# Patient Record
Sex: FEMALE | Race: BLACK OR AFRICAN AMERICAN | NOT HISPANIC OR LATINO | Employment: FULL TIME | URBAN - METROPOLITAN AREA
[De-identification: names, ages, dates, MRNs, and addresses within clinical notes are randomized per-mention and may not be internally consistent; named-entity substitution may affect disease eponyms.]

---

## 2018-05-21 ENCOUNTER — HOSPITAL ENCOUNTER (EMERGENCY)
Facility: HOSPITAL | Age: 23
Discharge: HOME | End: 2018-05-21
Payer: MEDICAID

## 2018-05-21 VITALS
WEIGHT: 220 LBS | HEART RATE: 78 BPM | RESPIRATION RATE: 16 BRPM | TEMPERATURE: 99 F | DIASTOLIC BLOOD PRESSURE: 68 MMHG | SYSTOLIC BLOOD PRESSURE: 122 MMHG | OXYGEN SATURATION: 99 %

## 2018-05-21 DIAGNOSIS — K59.09 INTERMITTENT CONSTIPATION: Primary | ICD-10-CM

## 2018-05-21 DIAGNOSIS — R10.9 ABDOMINAL PAIN: ICD-10-CM

## 2018-05-21 LAB
ALBUMIN SERPL BCP-MCNC: 3.7 G/DL
ALP SERPL-CCNC: 71 U/L
ALT SERPL W/O P-5'-P-CCNC: 13 U/L
ANION GAP SERPL CALC-SCNC: 10 MMOL/L
AST SERPL-CCNC: 17 U/L
B-HCG UR QL: NEGATIVE
BASOPHILS # BLD AUTO: 0.03 K/UL
BASOPHILS NFR BLD: 0.5 %
BILIRUB SERPL-MCNC: 0.7 MG/DL
BILIRUB UR QL STRIP: NEGATIVE
BUN SERPL-MCNC: 4 MG/DL
CALCIUM SERPL-MCNC: 9.5 MG/DL
CHLORIDE SERPL-SCNC: 102 MMOL/L
CLARITY UR: CLEAR
CO2 SERPL-SCNC: 25 MMOL/L
COLOR UR: YELLOW
CREAT SERPL-MCNC: 0.7 MG/DL
DIFFERENTIAL METHOD: NORMAL
EOSINOPHIL # BLD AUTO: 0.1 K/UL
EOSINOPHIL NFR BLD: 1.5 %
ERYTHROCYTE [DISTWIDTH] IN BLOOD BY AUTOMATED COUNT: 14.2 %
EST. GFR  (AFRICAN AMERICAN): >60 ML/MIN/1.73 M^2
EST. GFR  (NON AFRICAN AMERICAN): >60 ML/MIN/1.73 M^2
GLUCOSE SERPL-MCNC: 86 MG/DL
GLUCOSE UR QL STRIP: NEGATIVE
HCT VFR BLD AUTO: 38.5 %
HGB BLD-MCNC: 13.3 G/DL
HGB UR QL STRIP: NEGATIVE
KETONES UR QL STRIP: NEGATIVE
LEUKOCYTE ESTERASE UR QL STRIP: NEGATIVE
LIPASE SERPL-CCNC: 13 U/L
LYMPHOCYTES # BLD AUTO: 1.6 K/UL
LYMPHOCYTES NFR BLD: 26.3 %
MCH RBC QN AUTO: 28.5 PG
MCHC RBC AUTO-ENTMCNC: 34.5 G/DL
MCV RBC AUTO: 82 FL
MONOCYTES # BLD AUTO: 0.6 K/UL
MONOCYTES NFR BLD: 9.4 %
NEUTROPHILS # BLD AUTO: 3.8 K/UL
NEUTROPHILS NFR BLD: 62.3 %
NITRITE UR QL STRIP: NEGATIVE
PH UR STRIP: 6 [PH] (ref 5–8)
PLATELET # BLD AUTO: 308 K/UL
PMV BLD AUTO: 9.7 FL
POTASSIUM SERPL-SCNC: 3.7 MMOL/L
PROT SERPL-MCNC: 8 G/DL
PROT UR QL STRIP: NEGATIVE
RBC # BLD AUTO: 4.67 M/UL
SODIUM SERPL-SCNC: 137 MMOL/L
SP GR UR STRIP: >=1.03 (ref 1–1.03)
URN SPEC COLLECT METH UR: ABNORMAL
UROBILINOGEN UR STRIP-ACNC: 1 EU/DL
WBC # BLD AUTO: 6.05 K/UL

## 2018-05-21 PROCEDURE — 83690 ASSAY OF LIPASE: CPT

## 2018-05-21 PROCEDURE — 99284 EMERGENCY DEPT VISIT MOD MDM: CPT | Mod: 25

## 2018-05-21 PROCEDURE — 80053 COMPREHEN METABOLIC PANEL: CPT

## 2018-05-21 PROCEDURE — 81025 URINE PREGNANCY TEST: CPT

## 2018-05-21 PROCEDURE — 96374 THER/PROPH/DIAG INJ IV PUSH: CPT

## 2018-05-21 PROCEDURE — 85025 COMPLETE CBC W/AUTO DIFF WBC: CPT

## 2018-05-21 PROCEDURE — 81003 URINALYSIS AUTO W/O SCOPE: CPT

## 2018-05-21 PROCEDURE — 63600175 PHARM REV CODE 636 W HCPCS: Performed by: REGISTERED NURSE

## 2018-05-21 RX ORDER — DOCUSATE SODIUM 100 MG/1
100 CAPSULE, LIQUID FILLED ORAL 2 TIMES DAILY
Qty: 60 CAPSULE | Refills: 0 | Status: SHIPPED | OUTPATIENT
Start: 2018-05-21

## 2018-05-21 RX ORDER — KETOROLAC TROMETHAMINE 30 MG/ML
30 INJECTION, SOLUTION INTRAMUSCULAR; INTRAVENOUS
Status: COMPLETED | OUTPATIENT
Start: 2018-05-21 | End: 2018-05-21

## 2018-05-21 RX ADMIN — KETOROLAC TROMETHAMINE 30 MG: 30 INJECTION, SOLUTION INTRAMUSCULAR; INTRAVENOUS at 12:05

## 2018-05-21 NOTE — ED PROVIDER NOTES
History      Chief Complaint   Patient presents with    Abdominal Pain     left upper abdominal pain since this morning; denies n/v/d; pt reports pain has improvement since she has got to ED       Review of patient's allergies indicates:  No Known Allergies     HPI   HPI    5/21/2018, 12:15 PM   History obtained from the patient      History of Present Illness: Hermelinda Kitchen is a 22 y.o. female patient who presents to the Emergency Department for LUQ abdominal pain which onset gradually this morning. Pt reports resolution of symptoms at this time. Symptoms are intermittent and moderate in severity. No mitigating or exacerbating factors reported. Associated sxs include irregular bowel movements. Patient denies any NVD, fever, chills, and all other sxs at this time. No prior tx. No further complaints or concerns at this time.         Arrival mode: Personal vehicle     PCP: Sharri Goldman MD       Past Medical History:  Past Medical History:   Diagnosis Date    Asthma        Past Surgical History:  Past Surgical History:   Procedure Laterality Date    TONSILLECTOMY           Family History:  Family History   Problem Relation Age of Onset    Hypertension Mother     Diabetes Mother        Social History:  Social History     Social History Main Topics    Smoking status: Never Smoker    Smokeless tobacco: Not on file    Alcohol use No    Drug use: No    Sexual activity: Not on file       ROS   Review of Systems   Constitutional: Negative for fever.   HENT: Negative for sore throat.    Respiratory: Negative for shortness of breath.    Cardiovascular: Negative for chest pain.   Gastrointestinal: Positive for abdominal pain and constipation. Negative for nausea.   Genitourinary: Negative for dysuria.   Musculoskeletal: Negative for back pain.   Skin: Negative for rash.   Neurological: Negative for weakness.   Hematological: Does not bruise/bleed easily.   All other systems reviewed and are  negative.      Physical Exam      Initial Vitals [05/21/18 1212]   BP Pulse Resp Temp SpO2   (!) 147/82 89 16 98.6 °F (37 °C) 98 %      MAP       103.67          Physical Exam  Nursing Notes and Vital Signs Reviewed.  Constitutional: Patient is in no acute distress. Well-developed and well-nourished.  Head: Atraumatic. Normocephalic.  Eyes: PERRL. EOM intact. Conjunctivae are not pale. No scleral icterus.  ENT: Mucous membranes are moist. Oropharynx is clear and symmetric.    Neck: Supple. Full ROM. No lymphadenopathy.  Cardiovascular: Regular rate. Regular rhythm. No murmurs, rubs, or gallops. Distal pulses are 2+ and symmetric.  Pulmonary/Chest: No respiratory distress. Clear to auscultation bilaterally. No wheezing or rales.  Abdominal: Soft and non-distended.  There is mild tenderness to LUQ.  No rebound, guarding, or rigidity. Good bowel sounds.  Genitourinary: No CVA tenderness  Musculoskeletal: Moves all extremities. No obvious deformities. No edema. No calf tenderness.  Skin: Warm and dry.  Neurological:  Alert, awake, and appropriate.  Normal speech.  No acute focal neurological deficits are appreciated.  Psychiatric: Normal affect. Good eye contact. Appropriate in content.    ED Course    Procedures  ED Vital Signs:  Vitals:    05/21/18 1212 05/21/18 1334   BP: (!) 147/82 122/68   Pulse: 89 78   Resp: 16 16   Temp: 98.6 °F (37 °C)    TempSrc: Oral    SpO2: 98% 99%   Weight: 99.8 kg (220 lb)        Abnormal Lab Results:  Labs Reviewed   URINALYSIS - Abnormal; Notable for the following:        Result Value    Specific Gravity, UA >=1.030 (*)     All other components within normal limits   COMPREHENSIVE METABOLIC PANEL - Abnormal; Notable for the following:     BUN, Bld 4 (*)     All other components within normal limits   PREGNANCY TEST, URINE RAPID   CBC W/ AUTO DIFFERENTIAL   LIPASE        All Lab Results:      Imaging Results:  Imaging Results          X-Ray Abdomen Flat And Erect (Final result)  Result  time 05/21/18 13:06:42    Final result by TAJ Fernandez Sr., MD (05/21/18 13:06:42)                 Impression:      Normal study.      Electronically signed by: Camilo Fernandez MD  Date:    05/21/2018  Time:    13:06             Narrative:    EXAMINATION:  XR ABDOMEN FLAT AND ERECT    CLINICAL HISTORY:  Unspecified abdominal pain    COMPARISON:  None    FINDINGS:  The bowel gas pattern is normal in appearance. There is no pneumoperitoneum. The bony structures appear intact.                                        The Emergency Provider reviewed the vital signs and test results, which are outlined above.    ED Discussion     1:21 PM: Reassessed pt at this time.  Pt states her condition has improved at this time. Discussed with pt all pertinent ED information and results. Discussed pt dx and plan of tx. Gave pt all f/u and return to the ED instructions. All questions and concerns were addressed at this time. Pt expresses understanding of information and instructions, and is comfortable with plan to discharge. Pt is stable for discharge.        ED Medication(s):  Medications   ketorolac injection 30 mg (30 mg Intravenous Given 5/21/18 1256)       Discharge Medication List as of 5/21/2018  1:12 PM      START taking these medications    Details   docusate sodium (COLACE) 100 MG capsule Take 1 capsule (100 mg total) by mouth 2 (two) times daily., Starting Mon 5/21/2018, Print             Follow-up Information     Sharri Goldman MD In 3 days.    Specialty:  Family Medicine  Contact information:  [address] [place] [address] [id number] [phone]                     Medical Decision Making                     Clinical Impression       ICD-10-CM ICD-9-CM   1. Intermittent constipation K59.00 564.00   2. Abdominal pain R10.9 789.00               Ishmael Burton Jr., [facility]  05/21/18 2084

## 2018-05-31 ENCOUNTER — HOSPITAL ENCOUNTER (EMERGENCY)
Facility: HOSPITAL | Age: 23
Discharge: HOME | End: 2018-06-01
Payer: MEDICAID

## 2018-05-31 DIAGNOSIS — H18.821 CORNEAL ABRASION OF RIGHT EYE DUE TO CONTACT LENS: Primary | ICD-10-CM

## 2018-05-31 DIAGNOSIS — H10.31 ACUTE CONJUNCTIVITIS OF RIGHT EYE, UNSPECIFIED ACUTE CONJUNCTIVITIS TYPE: ICD-10-CM

## 2018-05-31 PROCEDURE — 63600175 PHARM REV CODE 636 W HCPCS: Performed by: PHYSICIAN ASSISTANT

## 2018-05-31 PROCEDURE — 25000003 PHARM REV CODE 250: Performed by: PHYSICIAN ASSISTANT

## 2018-05-31 PROCEDURE — 99283 EMERGENCY DEPT VISIT LOW MDM: CPT

## 2018-05-31 RX ORDER — ERYTHROMYCIN 5 MG/G
OINTMENT OPHTHALMIC
Status: COMPLETED | OUTPATIENT
Start: 2018-05-31 | End: 2018-05-31

## 2018-05-31 RX ORDER — PROPARACAINE HYDROCHLORIDE 5 MG/ML
1 SOLUTION/ DROPS OPHTHALMIC
Status: DISCONTINUED | OUTPATIENT
Start: 2018-05-31 | End: 2018-05-31 | Stop reason: SDUPTHER

## 2018-05-31 RX ADMIN — ERYTHROMYCIN 1 INCH: 5 OINTMENT OPHTHALMIC at 11:05

## 2018-05-31 RX ADMIN — FLUORESCEIN SODIUM AND BENOXINATE HYDROCHLORIDE 1 DROP: 4; 2.5 SOLUTION OPHTHALMIC at 11:05

## 2018-06-01 VITALS
WEIGHT: 245.81 LBS | DIASTOLIC BLOOD PRESSURE: 75 MMHG | SYSTOLIC BLOOD PRESSURE: 125 MMHG | HEIGHT: 71 IN | RESPIRATION RATE: 18 BRPM | TEMPERATURE: 100 F | HEART RATE: 87 BPM | OXYGEN SATURATION: 98 % | BODY MASS INDEX: 34.41 KG/M2

## 2018-06-01 RX ORDER — ERYTHROMYCIN 5 MG/G
OINTMENT OPHTHALMIC EVERY 8 HOURS
Qty: 1 TUBE | Refills: 0 | Status: SHIPPED | OUTPATIENT
Start: 2018-06-01 | End: 2018-06-08

## 2018-06-01 NOTE — ED PROVIDER NOTES
Encounter Date: 5/31/2018       History     Chief Complaint   Patient presents with    Eye Pain     Right eye.  States had ulcerationin the past, feels the same.     The patient presents to the ER c/o pain and redness to her right eye. She states that the symptoms began gradually 2 days ago. She states that she thinks that her contact lens scratched her eye. She denies any splash, FB, or trauma to the eye. She states that the degree is moderate. She states that the course is constant. She states that the eye was crusted/matted this morning when she woke up. She denies any vision loss. She discarded her contacts and has only been using glasses for the past 2 days. She states that she has a local ophthalmologist, Dr Hay. She states that she has had a TD vaccine within the past 5 years. No additional symptoms, history, or concern.           Review of patient's allergies indicates:  No Known Allergies  Past Medical History:   Diagnosis Date    Asthma      Past Surgical History:   Procedure Laterality Date    TONSILLECTOMY       Family History   Problem Relation Age of Onset    Hypertension Mother     Diabetes Mother      Social History   Substance Use Topics    Smoking status: Never Smoker    Smokeless tobacco: Never Used    Alcohol use No     Review of Systems   Constitutional: Negative for chills and fever.   HENT: Negative for facial swelling.    Eyes: Positive for photophobia, pain, discharge and redness. Negative for visual disturbance.   Respiratory: Negative for shortness of breath.    Cardiovascular: Negative for chest pain.   Gastrointestinal: Negative for abdominal pain, nausea and vomiting.   Endocrine: Negative for polydipsia and polyuria.   Genitourinary: Negative for menstrual problem.   Musculoskeletal: Negative for arthralgias and myalgias.   Skin: Negative for rash.   Allergic/Immunologic: Negative for immunocompromised state.   Neurological: Negative for dizziness, light-headedness and  headaches.   Psychiatric/Behavioral: Negative for confusion.       Physical Exam     Initial Vitals [05/31/18 2305]   BP Pulse Resp Temp SpO2   132/88 91 16 99.5 °F (37.5 °C) 98 %      MAP       102.67         Physical Exam    Nursing note and vitals reviewed.  Constitutional: She appears well-developed and well-nourished.   HENT:   Head: Atraumatic.   Eyes:   PERRLA; EOMI; conjugate gaze; no proptosis; no ptosis; no periorbital swelling; no pain with occular movement.     Left eye: Unremarkable     Right eye: There is diffuse injection present. No FB seen. No corneal ulcer seen. No hemorrhage.    Cardiovascular: Normal rate.   Pulmonary/Chest: No respiratory distress.   Abdominal: She exhibits no distension.   Musculoskeletal: Normal range of motion.   Neurological: She is alert and oriented to person, place, and time. She has normal strength. No cranial nerve deficit or sensory deficit.   Skin: Skin is warm and dry. No rash noted. No erythema.   Psychiatric: She has a normal mood and affect. Her behavior is normal.         ED Course   EYE EXAM  Date/Time: 6/1/2018 1:26 AM  Performed by: KELI CAMERON  Authorized by: DUANE ADAME   Body area: eye  Anesthesia: local infiltration    Anesthesia:  Local Anesthetic: topical anesthetic  Eye examined with fluorescein.  Fluorescein uptake.  Corneal abrasion size: small  Corneal abrasion location: superior  No residual rust ring present.  Dressing: antibiotic ointment  Depth: superficial  Complexity: simple  Patient tolerance: Patient tolerated the procedure well with no immediate complications  Comments: Patient reported immediate relief of eye pain following administration of ophthalmic anesthetic. No globe injury. No foreign body. Lids everted for exam. No conjunctival edema. No corneal ulcer seen. There is a very superficial and miniscule corneal abrasion seen at the 1 O'clock position, superior to the visual axis. The eye was copiously irrigated using normal  saline. Erythromycin ophthalmic ointment was applied.         Labs Reviewed - No data to display          Medical Decision Making:   History:   Old Medical Records: I decided to obtain old medical records.  ED Management:  Eye exam performed  Erythromycin ophthalmic ointment provided   Patient instructed to discard contacts and to follow up with her ophthalmologist in the next 1-2 days for re-evaluation and further management.                       Clinical Impression:   The primary encounter diagnosis was Corneal abrasion of right eye due to contact lens. A diagnosis of Acute conjunctivitis of right eye, unspecified acute conjunctivitis type was also pertinent to this visit.    Disposition:   Disposition: Discharged  Condition: Stable                        Bar Mckinley PA-C  06/01/18 0129

## 2018-11-04 ENCOUNTER — HOSPITAL ENCOUNTER (EMERGENCY)
Facility: HOSPITAL | Age: 23
Discharge: HOME | End: 2018-11-04
Attending: FAMILY MEDICINE
Payer: MEDICAID

## 2018-11-04 VITALS
BODY MASS INDEX: 36.35 KG/M2 | SYSTOLIC BLOOD PRESSURE: 147 MMHG | HEART RATE: 89 BPM | OXYGEN SATURATION: 100 % | RESPIRATION RATE: 18 BRPM | DIASTOLIC BLOOD PRESSURE: 69 MMHG | HEIGHT: 71 IN | WEIGHT: 259.69 LBS | TEMPERATURE: 99 F

## 2018-11-04 DIAGNOSIS — F17.200 SMOKER: ICD-10-CM

## 2018-11-04 DIAGNOSIS — J00 ACUTE NASOPHARYNGITIS: Primary | ICD-10-CM

## 2018-11-04 PROCEDURE — 99283 EMERGENCY DEPT VISIT LOW MDM: CPT

## 2018-11-04 RX ORDER — FLUTICASONE PROPIONATE 50 MCG
1 SPRAY, SUSPENSION (ML) NASAL 2 TIMES DAILY PRN
Qty: 15 G | Refills: 0 | Status: SHIPPED | OUTPATIENT
Start: 2018-11-04

## 2018-11-04 RX ORDER — CETIRIZINE HYDROCHLORIDE 10 MG/1
10 TABLET ORAL DAILY
Qty: 30 TABLET | Refills: 0 | Status: SHIPPED | OUTPATIENT
Start: 2018-11-04 | End: 2019-11-04

## 2018-11-05 NOTE — ED PROVIDER NOTES
SCRIBE #1 NOTE: I, Izzy Buitrago, am scribing for, and in the presence of, Bailey Ferguson MD. I have scribed the entire note.      History      Chief Complaint   Patient presents with    General Illness     cough, congestion, sore throat, fatigue       Review of patient's allergies indicates:  No Known Allergies     HPI   HPI    11/4/2018, 8:25 PM   History obtained from the patient      History of Present Illness: Hermelinda Kitchen is a 23 y.o. female patient with a PMHx of Asthma who presents to the Emergency Department for sneezing which onset gradually 1 week ago. Symptoms are intermittent and moderate in severity. No mitigating or exacerbating factors reported. Associated sxs include non-productive cough, fatigue. Patient denies any fever, chills, congestion, rhinorrhea, sore throat, SOB, CP, HA, rash, n/v/d, and all other sxs at this time. Prior Tx includes OTC cough/congestion medication. She reports she has taken it QD for the last 3 days, instructions state to take Q6 hr. No further complaints or concerns at this time.       Arrival mode: Personal vehicle      PCP: Sharri Goldman MD        Past Medical History:  Past Medical History:   Diagnosis Date    Asthma        Past Surgical History:  Past Surgical History:   Procedure Laterality Date    TONSILLECTOMY           Family History:  Family History   Problem Relation Age of Onset    Hypertension Mother     Diabetes Mother        Social History:  Social History     Tobacco Use    Smoking status: Never Smoker    Smokeless tobacco: Never Used   Substance and Sexual Activity    Alcohol use: No    Drug use: No    Sexual activity: unknown       ROS   Review of Systems   Constitutional: Positive for fatigue. Negative for chills and fever.   HENT: Positive for sneezing. Negative for congestion, ear pain and sore throat.    Respiratory: Positive for cough (non-productive). Negative for shortness of breath.    Cardiovascular: Negative for chest  "pain.   Gastrointestinal: Negative for diarrhea, nausea and vomiting.   Genitourinary: Negative for dysuria.   Musculoskeletal: Negative for back pain.   Skin: Negative for rash.   Neurological: Negative for weakness and headaches.   Hematological: Does not bruise/bleed easily.   All other systems reviewed and are negative.    Physical Exam      Initial Vitals [11/04/18 2024]   BP Pulse Resp Temp SpO2   (!) 147/69 89 18 99 °F (37.2 °C) 100 %      MAP       --          Physical Exam  Nursing Notes and Vital Signs Reviewed.  Constitutional: Patient is in no acute distress. Well-developed and well-nourished.  Head: Atraumatic. Normocephalic.  Eyes: PERRL. EOM intact. Conjunctivae are not pale. No scleral icterus.  Ears: Right TM normal. Left TM normal. No erythema. No bulging. No effusion or air-fluid levels. No perforation.   Nose: Patent nares. Turbinates are normal. No drainage.   Throat: Moist mucous membranes. Posterior oropharynx is symmetric without erythema. Tonsillar exudate is not present. No trismus. Normal handling of secretions. No stridor.  Neck: Supple. Full ROM. No lymphadenopathy.  Cardiovascular: Regular rate. Regular rhythm. No murmurs, rubs, or gallops. Distal pulses are 2+ and symmetric.  Pulmonary/Chest: No respiratory distress. Clear to auscultation bilaterally. No wheezing or rales.  Abdominal: Soft and non-distended.    Musculoskeletal: Moves all extremities. No obvious deformities.   Skin: Warm and dry.  Neurological:  Alert, awake, and appropriate.  Normal speech.  No acute focal neurological deficits are appreciated.  Psychiatric: Normal affect. Good eye contact. Appropriate in content.    ED Course    Procedures  ED Vital Signs:  Vitals:    11/04/18 2024   BP: (!) 147/69   Pulse: 89   Resp: 18   Temp: 99 °F (37.2 °C)   TempSrc: Oral   SpO2: 100%   Weight: 117.8 kg (259 lb 11.2 oz)   Height: 5' 11" (1.803 m)              The Emergency Provider reviewed the vital signs and test results, which " are outlined above.    ED Discussion     8:25 PM: Initial assessment of pt.  Pt is awake, alert, and in NAD at this time. Discussed with pt all pertinent ED information. Discussed pt dx and plan of tx. Gave pt all f/u and return to the ED instructions. All questions and concerns were addressed at this time. Pt expresses understanding of information and instructions, and is comfortable with plan to discharge. Pt is stable for discharge.    Patient presents with upper respiratory like symptoms. Based on my assessment in the ED, I do not suspect any respiratory, airway, pulmonary, cardiovascular (including myocarditis), metabolic, CNS, medical, or surgical emergency medical condition. I believe that the patient's symptoms are most consistent with a viral illness. Patient is safe for discharge home with conservative therapy.    ED Medication(s):  Medications - No data to display    Discharge Medication List as of 11/4/2018  8:40 PM      START taking these medications    Details   cetirizine (ZYRTEC) 10 MG tablet Take 1 tablet (10 mg total) by mouth once daily., Starting Sun 11/4/2018, Until Mon 11/4/2019, Print      fluticasone (FLONASE) 50 mcg/actuation nasal spray 1 spray (50 mcg total) by Each Nare route 2 (two) times daily as needed for Rhinitis., Starting Sun 11/4/2018, Print           Follow-up Information     Sharri Goldman MD. Go in 1 week.    Specialty:  Family Medicine  Why:  If symptoms worsen  Contact information:  [address] [facility] [address]  [phone]                     Medical Decision Making        Additional MDM:   Smoking Cessation: The patient is a smoker. The patient was counseled on smoking cessation for: 3 minutes. The patient was counseled on tobacco related  health complications. Appropriate patient literature was given to the patient concerning tobacco cessation.        Scribe Attestation:   Scribe #1: I performed the above scribed service and the documentation accurately  describes the services I performed. I attest to the accuracy of the note.    Attending:   Physician Attestation Statement for Scribe #1: I, Bailey Ferguson MD, personally performed the services described in this documentation, as scribed by Izzy Buitrago, in my presence, and it is both accurate and complete.          Clinical Impression       ICD-10-CM ICD-9-CM   1. Acute nasopharyngitis J00 460   2. Smoker F17.200 305.1       Disposition:   Disposition: Discharged  Condition: Stable         Bailey Ferguson MD  11/05/18 0109

## 2018-11-06 ENCOUNTER — HOSPITAL ENCOUNTER (EMERGENCY)
Facility: HOSPITAL | Age: 23
Discharge: HOME | End: 2018-11-06
Attending: EMERGENCY MEDICINE
Payer: MEDICAID

## 2018-11-06 VITALS
OXYGEN SATURATION: 97 % | SYSTOLIC BLOOD PRESSURE: 142 MMHG | DIASTOLIC BLOOD PRESSURE: 81 MMHG | TEMPERATURE: 98 F | BODY MASS INDEX: 36.07 KG/M2 | RESPIRATION RATE: 18 BRPM | WEIGHT: 257.63 LBS | HEART RATE: 82 BPM | HEIGHT: 71 IN

## 2018-11-06 DIAGNOSIS — J06.9 URI, ACUTE: Primary | ICD-10-CM

## 2018-11-06 PROCEDURE — 99284 EMERGENCY DEPT VISIT MOD MDM: CPT

## 2018-11-06 RX ORDER — PROMETHAZINE HYDROCHLORIDE AND DEXTROMETHORPHAN HYDROBROMIDE 6.25; 15 MG/5ML; MG/5ML
SYRUP ORAL
Qty: 180 ML | Refills: 0 | Status: SHIPPED | OUTPATIENT
Start: 2018-11-06

## 2018-11-06 RX ORDER — PREDNISONE 50 MG/1
50 TABLET ORAL DAILY
Qty: 5 TABLET | Refills: 0 | Status: SHIPPED | OUTPATIENT
Start: 2018-11-06 | End: 2018-11-11

## 2018-11-06 NOTE — ED PROVIDER NOTES
Encounter Date: 11/6/2018       History     Chief Complaint   Patient presents with    Cough     with congestion and body aches for a few days     23 year old female with complaint of cough, congestion and runny nose X 7 days. No fever but reports chills. No difficulty breathing.  No neck pain. No worsening or alleviating factors.            Review of patient's allergies indicates:  No Known Allergies  Past Medical History:   Diagnosis Date    Asthma      Past Surgical History:   Procedure Laterality Date    TONSILLECTOMY       Family History   Problem Relation Age of Onset    Hypertension Mother     Diabetes Mother      Social History     Tobacco Use    Smoking status: Never Smoker    Smokeless tobacco: Never Used   Substance Use Topics    Alcohol use: No    Drug use: No     Review of Systems   Constitutional: Negative for fever.   HENT: Positive for congestion. Negative for sore throat.    Respiratory: Positive for cough. Negative for shortness of breath.    Cardiovascular: Negative for chest pain.   Gastrointestinal: Negative for nausea.   Genitourinary: Negative for dysuria.   Musculoskeletal: Negative for back pain.   Skin: Negative for rash.   Neurological: Negative for weakness.   Hematological: Does not bruise/bleed easily.       Physical Exam     Initial Vitals [11/06/18 0955]   BP Pulse Resp Temp SpO2   (!) 142/81 82 18 98 °F (36.7 °C) 97 %      MAP       --         Physical Exam    Nursing note and vitals reviewed.  Constitutional: She appears well-developed and well-nourished.   HENT:   Head: Normocephalic and atraumatic.   Mouth/Throat: No oropharyngeal exudate.   + nasal congestion, no facial tenderness   Eyes: Conjunctivae and EOM are normal. Pupils are equal, round, and reactive to light.   Neck: Normal range of motion. Neck supple.   Cardiovascular: Normal rate, regular rhythm, normal heart sounds and intact distal pulses.   Pulmonary/Chest: Breath sounds normal.   Abdominal: Soft. There is  no tenderness. There is no rebound and no guarding.   Musculoskeletal: Normal range of motion.   Neurological: She is alert and oriented to person, place, and time. She has normal strength and normal reflexes.   Skin: Skin is warm and dry.   Psychiatric: She has a normal mood and affect. Her behavior is normal. Thought content normal.         ED Course   Procedures  Labs Reviewed - No data to display       Imaging Results    None                               Clinical Impression:   The encounter diagnosis was URI, acute.                             Checo Herrera NP  11/06/18 1004

## 2020-12-16 ENCOUNTER — HOSPITAL ENCOUNTER (EMERGENCY)
Facility: HOSPITAL | Age: 25
Discharge: HOME | End: 2020-12-16
Attending: EMERGENCY MEDICINE
Payer: COMMERCIAL

## 2020-12-16 VITALS
OXYGEN SATURATION: 100 % | DIASTOLIC BLOOD PRESSURE: 67 MMHG | WEIGHT: 188 LBS | TEMPERATURE: 99 F | SYSTOLIC BLOOD PRESSURE: 121 MMHG | HEART RATE: 85 BPM | BODY MASS INDEX: 26.32 KG/M2 | HEIGHT: 71 IN | RESPIRATION RATE: 16 BRPM

## 2020-12-16 DIAGNOSIS — T23.262A PARTIAL THICKNESS BURN OF BACK OF LEFT HAND, INITIAL ENCOUNTER: Primary | ICD-10-CM

## 2020-12-16 PROCEDURE — 63600175 PHARM REV CODE 636 W HCPCS: Performed by: PHYSICIAN ASSISTANT

## 2020-12-16 PROCEDURE — 99283 EMERGENCY DEPT VISIT LOW MDM: CPT | Mod: 25

## 2020-12-16 PROCEDURE — 90715 TDAP VACCINE 7 YRS/> IM: CPT | Performed by: PHYSICIAN ASSISTANT

## 2020-12-16 PROCEDURE — 90471 IMMUNIZATION ADMIN: CPT | Performed by: PHYSICIAN ASSISTANT

## 2020-12-16 PROCEDURE — 16000 INITIAL TREATMENT OF BURN(S): CPT

## 2020-12-16 RX ORDER — SILVER SULFADIAZINE 10 G/1000G
CREAM TOPICAL 2 TIMES DAILY
Qty: 30 G | Refills: 1 | Status: SHIPPED | OUTPATIENT
Start: 2020-12-16

## 2020-12-16 RX ADMIN — CLOSTRIDIUM TETANI TOXOID ANTIGEN (FORMALDEHYDE INACTIVATED), CORYNEBACTERIUM DIPHTHERIAE TOXOID ANTIGEN (FORMALDEHYDE INACTIVATED), BORDETELLA PERTUSSIS TOXOID ANTIGEN (GLUTARALDEHYDE INACTIVATED), BORDETELLA PERTUSSIS FILAMENTOUS HEMAGGLUTININ ANTIGEN (FORMALDEHYDE INACTIVATED), BORDETELLA PERTUSSIS PERTACTIN ANTIGEN, AND BORDETELLA PERTUSSIS FIMBRIAE 2/3 ANTIGEN 0.5 ML: 5; 2; 2.5; 5; 3; 5 INJECTION, SUSPENSION INTRAMUSCULAR at 01:12
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
## 2020-12-16 NOTE — Clinical Note
"Hermelinda "Hermelindathierno Kitchen was seen and treated in our emergency department on 12/16/2020.  She may return to work on 12/19/2020.       If you have any questions or concerns, please don't hesitate to call.      AIDA Ferrell"

## 2020-12-16 NOTE — ED NOTES
No sign/symptom(s) of reaction to vaccine observed by RN and/or reported by pt. DC indicated per MD. DC instructions explained to pt., who verbalized understanding. NAD, VSS, resp. E/u. AAOx4. Ambulatory out of ED with even, steady gait. Copy of DC instructions provided to registration team member to give to patient with checkout. Pt. At registration desk for checkout.

## 2021-05-14 ENCOUNTER — HOSPITAL ENCOUNTER (EMERGENCY)
Facility: HOSPITAL | Age: 26
Discharge: HOME | End: 2021-05-14
Attending: FAMILY MEDICINE
Payer: COMMERCIAL

## 2021-05-14 VITALS
WEIGHT: 180.75 LBS | HEIGHT: 71 IN | SYSTOLIC BLOOD PRESSURE: 131 MMHG | BODY MASS INDEX: 25.31 KG/M2 | TEMPERATURE: 99 F | RESPIRATION RATE: 18 BRPM | HEART RATE: 79 BPM | OXYGEN SATURATION: 100 % | DIASTOLIC BLOOD PRESSURE: 77 MMHG

## 2021-05-14 DIAGNOSIS — S16.1XXA CERVICAL STRAIN, ACUTE, INITIAL ENCOUNTER: ICD-10-CM
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
## 2021-05-14 PROCEDURE — 99284 EMERGENCY DEPT VISIT MOD MDM: CPT

## 2021-05-14 RX ORDER — NAPROXEN 500 MG/1
500 TABLET ORAL 2 TIMES DAILY WITH MEALS
Qty: 12 TABLET | Refills: 0 | Status: SHIPPED | OUTPATIENT
Start: 2021-05-14

## 2021-05-14 RX ORDER — METHOCARBAMOL 500 MG/1
1000 TABLET, FILM COATED ORAL 3 TIMES DAILY
Qty: 30 TABLET | Refills: 0 | Status: SHIPPED | OUTPATIENT
Start: 2021-05-14 | End: 2021-05-19

## 2022-06-25 ENCOUNTER — HOSPITAL ENCOUNTER (EMERGENCY)
Facility: HOSPITAL | Age: 27
Discharge: HOME | End: 2022-06-25
Attending: EMERGENCY MEDICINE
Payer: MEDICAID

## 2022-06-25 VITALS
TEMPERATURE: 99 F | OXYGEN SATURATION: 99 % | DIASTOLIC BLOOD PRESSURE: 72 MMHG | WEIGHT: 162.69 LBS | BODY MASS INDEX: 22.69 KG/M2 | SYSTOLIC BLOOD PRESSURE: 120 MMHG | RESPIRATION RATE: 17 BRPM | HEART RATE: 87 BPM

## 2022-06-25 DIAGNOSIS — K52.9 GASTROENTERITIS: Primary | ICD-10-CM

## 2022-06-25 DIAGNOSIS — R11.10 EMESIS: ICD-10-CM

## 2022-06-25 LAB
ALBUMIN SERPL BCP-MCNC: 3.5 G/DL (ref 3.5–5.2)
ALP SERPL-CCNC: 43 U/L (ref 55–135)
ALT SERPL W/O P-5'-P-CCNC: 14 U/L (ref 10–44)
ANION GAP SERPL CALC-SCNC: 7 MMOL/L (ref 8–16)
AST SERPL-CCNC: 23 U/L (ref 10–40)
B-HCG UR QL: NEGATIVE
BACTERIA #/AREA URNS HPF: NORMAL /HPF
BASOPHILS # BLD AUTO: 0.01 K/UL (ref 0–0.2)
BASOPHILS NFR BLD: 0.2 % (ref 0–1.9)
BILIRUB SERPL-MCNC: 0.7 MG/DL (ref 0.1–1)
BILIRUB UR QL STRIP: NEGATIVE
BUN SERPL-MCNC: 7 MG/DL (ref 6–20)
CALCIUM SERPL-MCNC: 8.5 MG/DL (ref 8.7–10.5)
CHLORIDE SERPL-SCNC: 107 MMOL/L (ref 95–110)
CLARITY UR: CLEAR
CO2 SERPL-SCNC: 24 MMOL/L (ref 23–29)
COLOR UR: YELLOW
CREAT SERPL-MCNC: 0.7 MG/DL (ref 0.5–1.4)
CTP QC/QA: YES
CTP QC/QA: YES
DIFFERENTIAL METHOD: ABNORMAL
EOSINOPHIL # BLD AUTO: 0.1 K/UL (ref 0–0.5)
EOSINOPHIL NFR BLD: 0.9 % (ref 0–8)
ERYTHROCYTE [DISTWIDTH] IN BLOOD BY AUTOMATED COUNT: 12.8 % (ref 11.5–14.5)
EST. GFR  (AFRICAN AMERICAN): >60 ML/MIN/1.73 M^2
EST. GFR  (NON AFRICAN AMERICAN): >60 ML/MIN/1.73 M^2
GLUCOSE SERPL-MCNC: 88 MG/DL (ref 70–110)
GLUCOSE UR QL STRIP: NEGATIVE
HCT VFR BLD AUTO: 36.3 % (ref 37–48.5)
HGB BLD-MCNC: 12.6 G/DL (ref 12–16)
HGB UR QL STRIP: ABNORMAL
IMM GRANULOCYTES # BLD AUTO: 0.02 K/UL (ref 0–0.04)
IMM GRANULOCYTES NFR BLD AUTO: 0.3 % (ref 0–0.5)
KETONES UR QL STRIP: NEGATIVE
LEUKOCYTE ESTERASE UR QL STRIP: NEGATIVE
LIPASE SERPL-CCNC: 15 U/L (ref 4–60)
LYMPHOCYTES # BLD AUTO: 0.5 K/UL (ref 1–4.8)
LYMPHOCYTES NFR BLD: 7.7 % (ref 18–48)
MCH RBC QN AUTO: 31.2 PG (ref 27–31)
MCHC RBC AUTO-ENTMCNC: 34.7 G/DL (ref 32–36)
MCV RBC AUTO: 90 FL (ref 82–98)
MICROSCOPIC COMMENT: NORMAL
MONOCYTES # BLD AUTO: 0.4 K/UL (ref 0.3–1)
MONOCYTES NFR BLD: 5.8 % (ref 4–15)
NEUTROPHILS # BLD AUTO: 5.4 K/UL (ref 1.8–7.7)
NEUTROPHILS NFR BLD: 85.1 % (ref 38–73)
NITRITE UR QL STRIP: NEGATIVE
NRBC BLD-RTO: 0 /100 WBC
PH UR STRIP: 6 [PH] (ref 5–8)
PLATELET # BLD AUTO: 255 K/UL (ref 150–450)
PMV BLD AUTO: 9.4 FL (ref 9.2–12.9)
POTASSIUM SERPL-SCNC: 4.3 MMOL/L (ref 3.5–5.1)
PROT SERPL-MCNC: 6.7 G/DL (ref 6–8.4)
PROT UR QL STRIP: NEGATIVE
RBC # BLD AUTO: 4.04 M/UL (ref 4–5.4)
RBC #/AREA URNS HPF: 3 /HPF (ref 0–4)
SARS-COV-2 RDRP RESP QL NAA+PROBE: NEGATIVE
SARS-COV-2 RDRP RESP QL NAA+PROBE: NEGATIVE
SODIUM SERPL-SCNC: 138 MMOL/L (ref 136–145)
SP GR UR STRIP: 1.02 (ref 1–1.03)
URN SPEC COLLECT METH UR: ABNORMAL
UROBILINOGEN UR STRIP-ACNC: ABNORMAL EU/DL
WBC # BLD AUTO: 6.39 K/UL (ref 3.9–12.7)
WBC #/AREA URNS HPF: 4 /HPF (ref 0–5)

## 2022-06-25 PROCEDURE — 99284 EMERGENCY DEPT VISIT MOD MDM: CPT | Mod: 25

## 2022-06-25 PROCEDURE — 83690 ASSAY OF LIPASE: CPT | Performed by: EMERGENCY MEDICINE

## 2022-06-25 PROCEDURE — 96374 THER/PROPH/DIAG INJ IV PUSH: CPT

## 2022-06-25 PROCEDURE — 81000 URINALYSIS NONAUTO W/SCOPE: CPT | Performed by: EMERGENCY MEDICINE

## 2022-06-25 PROCEDURE — 25000003 PHARM REV CODE 250: Performed by: EMERGENCY MEDICINE

## 2022-06-25 PROCEDURE — 96361 HYDRATE IV INFUSION ADD-ON: CPT

## 2022-06-25 PROCEDURE — 80053 COMPREHEN METABOLIC PANEL: CPT | Performed by: EMERGENCY MEDICINE

## 2022-06-25 PROCEDURE — U0002 COVID-19 LAB TEST NON-CDC: HCPCS | Performed by: EMERGENCY MEDICINE

## 2022-06-25 PROCEDURE — 85025 COMPLETE CBC W/AUTO DIFF WBC: CPT | Performed by: EMERGENCY MEDICINE

## 2022-06-25 PROCEDURE — 81025 URINE PREGNANCY TEST: CPT | Performed by: EMERGENCY MEDICINE

## 2022-06-25 PROCEDURE — 96375 TX/PRO/DX INJ NEW DRUG ADDON: CPT

## 2022-06-25 PROCEDURE — 63600175 PHARM REV CODE 636 W HCPCS: Performed by: EMERGENCY MEDICINE

## 2022-06-25 RX ORDER — ONDANSETRON 4 MG/1
4 TABLET, ORALLY DISINTEGRATING ORAL EVERY 6 HOURS PRN
Qty: 30 TABLET | Refills: 0 | Status: SHIPPED | OUTPATIENT
Start: 2022-06-25

## 2022-06-25 RX ORDER — KETOROLAC TROMETHAMINE 30 MG/ML
30 INJECTION, SOLUTION INTRAMUSCULAR; INTRAVENOUS
Status: COMPLETED | OUTPATIENT
Start: 2022-06-25 | End: 2022-06-25

## 2022-06-25 RX ORDER — ONDANSETRON 2 MG/ML
4 INJECTION INTRAMUSCULAR; INTRAVENOUS
Status: COMPLETED | OUTPATIENT
Start: 2022-06-25 | End: 2022-06-25

## 2022-06-25 RX ADMIN — KETOROLAC TROMETHAMINE 30 MG: 30 INJECTION, SOLUTION INTRAMUSCULAR at 08:06

## 2022-06-25 RX ADMIN — ONDANSETRON 4 MG: 2 INJECTION INTRAMUSCULAR; INTRAVENOUS at 08:06

## 2022-06-25 RX ADMIN — SODIUM CHLORIDE 1000 ML: 0.9 INJECTION, SOLUTION INTRAVENOUS at 08:06

## 2022-06-25 NOTE — ED NOTES
Pt sitting up in bed without distress. Blood recollected and sent to lab. Pt reports dull 6/10 pain to the left abdomen continues. She reports mild nausea, has not vomited since arriving in ED. Call bell in reach.

## 2022-06-25 NOTE — DISCHARGE INSTRUCTIONS
Discussed the signs and symptoms of gastroenteritis with patient including: abdominal pain; nausea, vomiting, and diarrhea; chills; clammy skin; excessive sweating; fever; joint stiffness; fecal incontinence; muscle pain; and weight loss. Advised patient to drink water or sports beverages such as Gatorade for electrolyte replacement; do NOT use fruit juice (including apple juice), sodas or cola (flat or bubbly), Jell-O, or broth due to high sugar content; drink small amounts of fluid (2-4 oz.) every 30-60 minutes; and eat small amounts of food, when tolerable such as cereals, bread, potatoes, apples, bananas, and vegetables.  I also instructed on disease transmission and need for frequent hand washing.    Regarding VOMITING, I advised patient on importance of staying well hydrated; eating frequent, small amounts of clear liquids; avoid solid foods until there has been no vomiting for six hours, and then work slowly back to a normal diet; and to use an OTC bismuth stomach remedy for upset stomach, nausea, indigestion, and diarrhea. I discussed signs and symptoms of dehydration and possible causes of nausea and vomiting including viral infections, medications, migraine headaches, food poisoning, allergies, and peptic ulcer disease. Instructed patient to take medications as prescribed and to follow up with primary care provider or return to ER if condition worsens.

## 2022-06-25 NOTE — ED PROVIDER NOTES
"SCRIBE #1 NOTE: I, Sofya Bee, am scribing for, and in the presence of, Emile Jackman Jr., MD. I have scribed the entire note.      History      Chief Complaint   Patient presents with    Vomiting     Diarrhea this morning and an episode of vomiting with "dark red blood"       Review of patient's allergies indicates:  No Known Allergies     HPI   HPI    6/25/2022, 8:29 AM   History obtained from the patient      History of Present Illness: Hermelinda Kitchen is a 26 y.o. female patient with a PMHx of asthma who presents to the Emergency Department for L-sided abdominal pain which was present when she woke up this morning. The pt reports that her pain started in the epigastric region of the abdomen and described the pain as a burning sensation. She also reports that she started to vomit and had thick emesis with blood clots present. She believes that she could have ate something that caused her sxs, but that she is on her menstrual cycle and has not been eating as much as she usually does. Now, the pt c/o L-sided abdominal pain and states that she has not vomited in the ED. Symptoms are constant and moderate in severity. No mitigating or exacerbating factors reported. Associated sxs include N/V/D and hematemesis. Patient denies any blood in stool, hematuria, dysuria, fever, chills, CP, SOB, weakness, and all other sxs at this time. No prior Tx reported. The pt denies having any sick contacts. No further complaints or concerns at this time.         Arrival mode: Personal vehicle      PCP: Sharri Goldman MD       Past Medical History:  Past Medical History:   Diagnosis Date    Asthma        Past Surgical History:  Past Surgical History:   Procedure Laterality Date    TONSILLECTOMY           Family History:  Family History   Problem Relation Age of Onset    Hypertension Mother     Diabetes Mother        Social History:  Social History     Tobacco Use    Smoking status: Never Smoker    Smokeless " tobacco: Never Used   Substance and Sexual Activity    Alcohol use: No    Drug use: No    Sexual activity: Not on file       ROS   Review of Systems   Constitutional: Negative for chills and fever.   HENT: Negative for sore throat.    Respiratory: Negative for shortness of breath.    Cardiovascular: Negative for chest pain.   Gastrointestinal: Positive for abdominal pain (L-sided), diarrhea, nausea and vomiting. Negative for blood in stool.        (+) hematemesis   Genitourinary: Negative for dysuria and hematuria.   Musculoskeletal: Negative for back pain.   Skin: Negative for rash.   Neurological: Negative for weakness.   Hematological: Does not bruise/bleed easily.   All other systems reviewed and are negative.    Physical Exam      Initial Vitals [06/25/22 0740]   BP Pulse Resp Temp SpO2   129/62 95 18 98.7 °F (37.1 °C) 100 %      MAP       --          Physical Exam  Nursing Notes and Vital Signs Reviewed.  Constitutional: Patient is in no acute distress. Well-developed and well-nourished.  Head: Atraumatic. Normocephalic.  Eyes: PERRL. EOM intact. Conjunctivae are not pale. No scleral icterus.  ENT: Mucous membranes are moist. Oropharynx is clear and symmetric.    Neck: Supple. Full ROM. No lymphadenopathy.  Cardiovascular: Regular rate. Regular rhythm. No murmurs, rubs, or gallops. Distal pulses are 2+ and symmetric.  Pulmonary/Chest: No respiratory distress. Clear to auscultation bilaterally. No wheezing or rales.  Abdominal: Soft and non-distended.  There is generalized abdominal tenderness.  No rebound, guarding, or rigidity.   Musculoskeletal: Moves all extremities. No obvious deformities. No edema.  Skin: Warm and dry.  Neurological:  Alert, awake, and appropriate.  Normal speech.  No acute focal neurological deficits are appreciated.  Psychiatric: Normal affect. Good eye contact. Appropriate in content.    ED Course    Procedures  ED Vital Signs:  Vitals:    06/25/22 0740 06/25/22 1120   BP: 129/62  120/72   Pulse: 95 87   Resp: 18 17   Temp: 98.7 °F (37.1 °C)    TempSrc: Oral    SpO2: 100% 99%   Weight: 73.8 kg (162 lb 11.2 oz)        Abnormal Lab Results:  Labs Reviewed   CBC W/ AUTO DIFFERENTIAL - Abnormal; Notable for the following components:       Result Value    Hematocrit 36.3 (*)     MCH 31.2 (*)     Lymph # 0.5 (*)     Gran % 85.1 (*)     Lymph % 7.7 (*)     All other components within normal limits   URINALYSIS, REFLEX TO URINE CULTURE - Abnormal; Notable for the following components:    Occult Blood UA 2+ (*)     Urobilinogen, UA 4.0-6.0 (*)     All other components within normal limits    Narrative:     Specimen Source->Urine   COMPREHENSIVE METABOLIC PANEL - Abnormal; Notable for the following components:    Calcium 8.5 (*)     Alkaline Phosphatase 43 (*)     Anion Gap 7 (*)     All other components within normal limits   PREGNANCY TEST, URINE RAPID    Narrative:     Specimen Source->Urine   URINALYSIS MICROSCOPIC    Narrative:     Specimen Source->Urine   LIPASE   SARS-COV-2 RDRP GENE    Narrative:     This test utilizes isothermal nucleic acid amplification   technology to detect the SARS-CoV-2 RdRp nucleic acid segment.   The analytical sensitivity (limit of detection) is 125 genome   equivalents/mL.   A POSITIVE result implies infection with the SARS-CoV-2 virus;   the patient is presumed to be contagious.     A NEGATIVE result means that SARS-CoV-2 nucleic acids are not   present above the limit of detection. A NEGATIVE result should be   treated as presumptive. It does not rule out the possibility of   COVID-19 and should not be the sole basis for treatment decisions.   If COVID-19 is strongly suspected based on clinical and exposure   history, re-testing using an alternate molecular assay should be   considered.   This test is only for use under the Food and Drug   Administration s Emergency Use Authorization (EUA).   Commercial kits are provided by [employer].   Performance  characteristics of the EUA have been independently   verified by [facility] Department of   Pathology and Laboratory Medicine.   _________________________________________________________________   The authorized Fact Sheet for Healthcare Providers and the authorized Fact   Sheet for Patients of the ID NOW COVID-19 are available on the FDA   website:     https://www.fda.gov/media/379021/download  https://www.fda.gov/media/574062/download          SARS-COV-2 RDRP GENE    Narrative:     This test utilizes isothermal nucleic acid amplification   technology to detect the SARS-CoV-2 RdRp nucleic acid segment.   The analytical sensitivity (limit of detection) is 125 genome   equivalents/mL.   A POSITIVE result implies infection with the SARS-CoV-2 virus;   the patient is presumed to be contagious.     A NEGATIVE result means that SARS-CoV-2 nucleic acids are not   present above the limit of detection. A NEGATIVE result should be   treated as presumptive. It does not rule out the possibility of   COVID-19 and should not be the sole basis for treatment decisions.   If COVID-19 is strongly suspected based on clinical and exposure   history, re-testing using an alternate molecular assay should be   considered.   This test is only for use under the Food and Drug   Administration s Emergency Use Authorization (EUA).   Commercial kits are provided by [employer].   Performance characteristics of the EUA have been independently   verified by [facility] Department of   Pathology and Laboratory Medicine.   _________________________________________________________________   The authorized Fact Sheet for Healthcare Providers and the authorized Fact   Sheet for Patients of the ID NOW COVID-19 are available on the FDA   website:     https://www.fda.gov/media/549677/download  https://www.fda.gov/media/618892/download                All Lab Results:  Results for orders placed or performed during the hospital  encounter of 06/25/22   CBC W/ AUTO DIFFERENTIAL   Result Value Ref Range    WBC 6.39 3.90 - 12.70 K/uL    RBC 4.04 4.00 - 5.40 M/uL    Hemoglobin 12.6 12.0 - 16.0 g/dL    Hematocrit 36.3 (L) 37.0 - 48.5 %    MCV 90 82 - 98 fL    MCH 31.2 (H) 27.0 - 31.0 pg    MCHC 34.7 32.0 - 36.0 g/dL    RDW 12.8 11.5 - 14.5 %    Platelets 255 150 - 450 K/uL    MPV 9.4 9.2 - 12.9 fL    Immature Granulocytes 0.3 0.0 - 0.5 %    Gran # (ANC) 5.4 1.8 - 7.7 K/uL    Immature Grans (Abs) 0.02 0.00 - 0.04 K/uL    Lymph # 0.5 (L) 1.0 - 4.8 K/uL    Mono # 0.4 0.3 - 1.0 K/uL    Eos # 0.1 0.0 - 0.5 K/uL    Baso # 0.01 0.00 - 0.20 K/uL    nRBC 0 0 /100 WBC    Gran % 85.1 (H) 38.0 - 73.0 %    Lymph % 7.7 (L) 18.0 - 48.0 %    Mono % 5.8 4.0 - 15.0 %    Eosinophil % 0.9 0.0 - 8.0 %    Basophil % 0.2 0.0 - 1.9 %    Differential Method Automated    Urinalysis, Reflex to Urine Culture Urine, Clean Catch    Specimen: Urine   Result Value Ref Range    Specimen UA Urine, Clean Catch     Color, UA Yellow Yellow, Straw, Nanda    Appearance, UA Clear Clear    pH, UA 6.0 5.0 - 8.0    Specific Gravity, UA 1.020 1.005 - 1.030    Protein, UA Negative Negative    Glucose, UA Negative Negative    Ketones, UA Negative Negative    Bilirubin (UA) Negative Negative    Occult Blood UA 2+ (A) Negative    Nitrite, UA Negative Negative    Urobilinogen, UA 4.0-6.0 (A) <2.0 EU/dL    Leukocytes, UA Negative Negative   Pregnancy, urine rapid   Result Value Ref Range    Preg Test, Ur Negative    Urinalysis Microscopic   Result Value Ref Range    RBC, UA 3 0 - 4 /hpf    WBC, UA 4 0 - 5 /hpf    Bacteria Rare None-Occ /hpf    Microscopic Comment SEE COMMENT    Comprehensive metabolic panel   Result Value Ref Range    Sodium 138 136 - 145 mmol/L    Potassium 4.3 3.5 - 5.1 mmol/L    Chloride 107 95 - 110 mmol/L    CO2 24 23 - 29 mmol/L    Glucose 88 70 - 110 mg/dL    BUN 7 6 - 20 mg/dL    Creatinine 0.7 0.5 - 1.4 mg/dL    Calcium 8.5 (L) 8.7 - 10.5 mg/dL    Total Protein 6.7 6.0  - 8.4 g/dL    Albumin 3.5 3.5 - 5.2 g/dL    Total Bilirubin 0.7 0.1 - 1.0 mg/dL    Alkaline Phosphatase 43 (L) 55 - 135 U/L    AST 23 10 - 40 U/L    ALT 14 10 - 44 U/L    Anion Gap 7 (L) 8 - 16 mmol/L    eGFR if African American >60 >60 mL/min/1.73 m^2    eGFR if non African American >60 >60 mL/min/1.73 m^2   Lipase   Result Value Ref Range    Lipase 15 4 - 60 U/L   POCT COVID-19 Rapid Screening   Result Value Ref Range    POC Rapid COVID Negative Negative     Acceptable Yes    POCT COVID-19 Rapid Screening   Result Value Ref Range    POC Rapid COVID Negative Negative     Acceptable Yes          Imaging Results:  Imaging Results          X-Ray Abdomen Flat And Erect (Final result)  Result time 06/25/22 08:25:07    Final result by Tl Vivar MD (06/25/22 08:25:07)                 Impression:      Nonspecific bowel gas pattern with scattered air-fluid levels in nondilated bowel.      Electronically signed by: Tl Vivar MD  Date:    06/25/2022  Time:    08:25             Narrative:    EXAMINATION:  XR ABDOMEN FLAT AND ERECT    CLINICAL HISTORY:  Vomiting, unspecified    COMPARISON:  05/21/2018 abdominal x-ray    FINDINGS:  Lung bases are clear.    No free air.  Few minor scattered air-fluid levels in nondilated small bowel.                                        The Emergency Provider reviewed the vital signs and test results, which are outlined above.    ED Discussion     11:14 AM: Reassessed pt at this time.  Pt states that she is feeling better and able to tolerate PO. Discussed with pt all pertinent ED information and results. Discussed pt dx and plan of tx. Gave pt all f/u and return to the ED instructions. All questions and concerns were addressed at this time. Pt expresses understanding of information and instructions, and is comfortable with plan to discharge. Pt is stable for discharge.    I discussed with patient and/or family/caretaker that evaluation in the ED  does not suggest any emergent or life threatening medical conditions requiring immediate intervention beyond what was provided in the ED, and I believe patient is safe for discharge.  Regardless, an unremarkable evaluation in the ED does not preclude the development or presence of a serious of life threatening condition. As such, patient was instructed to return immediately for any worsening or change in current symptoms.    Discussed the signs and symptoms of gastroenteritis with patient including: abdominal pain; nausea, vomiting, and diarrhea; chills; clammy skin; excessive sweating; fever; joint stiffness; fecal incontinence; muscle pain; and weight loss. Advised patient to drink water or sports beverages such as Gatorade for electrolyte replacement; do NOT use fruit juice (including apple juice), sodas or cola (flat or bubbly), Jell-O, or broth due to high sugar content; drink small amounts of fluid (2-4 oz.) every 30-60 minutes; and eat small amounts of food, when tolerable such as cereals, bread, potatoes, apples, bananas, and vegetables.  I also instructed on disease transmission and need for frequent hand washing.    Regarding VOMITING, I advised patient on importance of staying well hydrated; eating frequent, small amounts of clear liquids; avoid solid foods until there has been no vomiting for six hours, and then work slowly back to a normal diet; and to use an OTC bismuth stomach remedy for upset stomach, nausea, indigestion, and diarrhea. I discussed signs and symptoms of dehydration and possible causes of nausea and vomiting including viral infections, medications, migraine headaches, food poisoning, allergies, and peptic ulcer disease. Instructed patient to take medications as prescribed and to follow up with primary care provider or return to ER if condition worsens.          ED Medication(s):  Medications   sodium chloride 0.9% bolus 1,000 mL (0 mLs Intravenous Stopped 6/25/22 0912)   ondansetron  injection 4 mg (4 mg Intravenous Given 6/25/22 0808)   ketorolac injection 30 mg (30 mg Intravenous Given 6/25/22 0809)        Follow-up Information     Sharri Goldman MD. Schedule an appointment as soon as possible for a visit in 1 week.    Specialty: Family Medicine  Contact information:  [address] [facility] [address]  [phone]             [facility] - Emergency Dept..    Specialty: Emergency Medicine  Why: As needed, If symptoms worsen  Contact information:  [id number] [place] [id number] [phone]                       Discharge Medication List as of 6/25/2022 11:15 AM      START taking these medications    Details   ondansetron (ZOFRAN-ODT) 4 MG TbDL Take 1 tablet (4 mg total) by mouth every 6 (six) hours as needed., Starting Sat 6/25/2022, Print              Medication List      START taking these medications    ondansetron 4 MG Tbdl  Commonly known as: ZOFRAN-ODT  Take 1 tablet (4 mg total) by mouth every 6 (six) hours as needed.        ASK your doctor about these medications    cetirizine 10 MG tablet  Commonly known as: ZYRTEC  Take 1 tablet (10 mg total) by mouth once daily.     docusate sodium 100 MG capsule  Commonly known as: COLACE  Take 1 capsule (100 mg total) by mouth 2 (two) times daily.     fluticasone propionate 50 mcg/actuation nasal spray  Commonly known as: FLONASE  1 spray (50 mcg total) by Each Nare route 2 (two) times daily as needed for Rhinitis.     naproxen 500 MG tablet  Commonly known as: NAPROSYN  Take 1 tablet (500 mg total) by mouth 2 (two) times daily with meals. Prn pain     promethazine-dextromethorphan 6.25-15 mg/5 mL Syrp  Commonly known as: PROMETHAZINE-DM  1-2 tsp PO every night prn cough     silver sulfADIAZINE 1% 1 % cream  Commonly known as: SILVADENE  Apply topically 2 (two) times daily.           Where to Get Your Medications      You can get these medications from any pharmacy    Bring a paper prescription for each of these  medications  · ondansetron 4 MG Tbdl           Medical Decision Making    Medical Decision Making:   Clinical Tests:   Lab Tests: Ordered and Reviewed  Radiological Study: Ordered and Reviewed           Scribe Attestation:   Scribe #1: I performed the above scribed service and the documentation accurately describes the services I performed. I attest to the accuracy of the note.    Attending:   Physician Attestation Statement for Scribe #1: I, Emile Jackman Jr., MD, personally performed the services described in this documentation, as scribed by Sofya Bee, in my presence, and it is both accurate and complete.          Clinical Impression       ICD-10-CM ICD-9-CM   1. Gastroenteritis  K52.9 558.9   2. Emesis  R11.10 787.03       Disposition:   Disposition: Discharged  Condition: Stable         Emile Jackman Jr., MD  06/26/22 0621